# Patient Record
Sex: MALE | Race: WHITE | NOT HISPANIC OR LATINO | Employment: UNEMPLOYED | URBAN - NONMETROPOLITAN AREA
[De-identification: names, ages, dates, MRNs, and addresses within clinical notes are randomized per-mention and may not be internally consistent; named-entity substitution may affect disease eponyms.]

---

## 2018-05-25 ENCOUNTER — OFFICE VISIT (OUTPATIENT)
Dept: PULMONOLOGY | Facility: CLINIC | Age: 58
End: 2018-05-25

## 2018-05-25 VITALS
DIASTOLIC BLOOD PRESSURE: 60 MMHG | OXYGEN SATURATION: 97 % | HEART RATE: 81 BPM | BODY MASS INDEX: 28.56 KG/M2 | HEIGHT: 67 IN | WEIGHT: 182 LBS | SYSTOLIC BLOOD PRESSURE: 115 MMHG | TEMPERATURE: 97.6 F

## 2018-05-25 DIAGNOSIS — R53.83 FATIGUE, UNSPECIFIED TYPE: ICD-10-CM

## 2018-05-25 DIAGNOSIS — G47.33 OSA (OBSTRUCTIVE SLEEP APNEA): Primary | ICD-10-CM

## 2018-05-25 PROCEDURE — 99204 OFFICE O/P NEW MOD 45 MIN: CPT | Performed by: INTERNAL MEDICINE

## 2018-05-25 RX ORDER — LISINOPRIL 2.5 MG/1
2.5 TABLET ORAL DAILY
COMMUNITY

## 2018-05-25 RX ORDER — ALPRAZOLAM 0.25 MG/1
0.25 TABLET ORAL 2 TIMES DAILY PRN
COMMUNITY

## 2018-05-25 NOTE — PROGRESS NOTES
"Subjective   Jovanni Lerner is a 57 y.o. male who is being seen for Sleep Apnea    History of Present Illness   This 57-year-old gentleman has been referred to us by his primary care provider for evaluation of sleep disturbance.  Patient tells me that he has an established diagnosis of obstructive sleep apnea and used CPAP good result.  But his sleep is fragmented again now and he does not feel fresh in the morning time.  In the daytime he remains excessively fatigued fatigued which is progressively increasing over the past few months.  Past Medical History:   Diagnosis Date   • Hypertension      Past Surgical History:   Procedure Laterality Date   • HERNIA REPAIR     • TONSILLECTOMY       Family History   Problem Relation Age of Onset   • Hypertension Mother    • Stroke Brother       reports that he has never smoked. He has never used smokeless tobacco. He reports that he does not drink alcohol or use drugs.  No Known Allergies        Patient has not received a flu shot or a pneumonia vaccination.     The following portions of the patient's history were reviewed and updated as appropriate: allergies, current medications, past family history, past medical history, past social history, past surgical history and problem list.    Review of Systems   Constitutional: Positive for fatigue (with increased daytime sleepiness).   HENT:        Loud snoring   Respiratory: Positive for apnea (Witnessed apnea per family/spouse) and shortness of breath (exhustional).    Cardiovascular: Positive for leg swelling.   Neurological: Positive for headaches.   Psychiatric/Behavioral: Positive for sleep disturbance (Fragmented sleep with unrefreshed feeling in the morning ).        SLEEP: Loud snoring, fragmented sleep, un refreshed feeling in the morning, increased daytime sleepiness    All other systems reviewed and are negative.      Objective   /60   Pulse 81   Temp 97.6 °F (36.4 °C) (Oral)   Ht 170.2 cm (67\")   Wt 82.6 kg " (182 lb)   SpO2 97%   BMI 28.51 kg/m²   Physical Exam   Constitutional: He is oriented to person, place, and time.   HENT:   Head: Normocephalic and atraumatic.   Nose: Mucosal edema present.   Eyes: EOM are normal. Pupils are equal, round, and reactive to light.   Neck: Neck supple.   Cardiovascular: Normal rate, regular rhythm and normal heart sounds.    Pulmonary/Chest: He has rhonchi.   Vesicular breath sound bilaterally with prolonged expiratory phase   Abdominal: Soft. Bowel sounds are normal.   Musculoskeletal: Normal range of motion. He exhibits no deformity.   Neurological: He is alert and oriented to person, place, and time.   Skin: Skin is warm and dry.   Psychiatric: He has a normal mood and affect. His behavior is normal.   Nursing note and vitals reviewed.        Radiology:  No Images in the past 120 days found..    Lab Results:  No results found for any previous visit.       Assessment      ICD-10-CM ICD-9-CM   1. GUANAKITO (obstructive sleep apnea) G47.33 327.23   2. Fatigue, unspecified type R53.83 780.79                DISCUSSION:  This patient's BMI is only 28 but he already had an established diagnosis of obstructive sleep apnea.  Currently his primary problem is excessive fatigue which could be from other medical reasons but we wanted to make sure that he is receiving the right pressure through the pressure device.  I'm sending him for a nocturnal polysomnography for CPAP titration and like to see him again after that.    Plan    Orders Placed This Encounter   Procedures   • Polysomnography 4 or More Parameters With CPAP     No orders of the defined types were placed in this encounter.                 Brandi Bynum MD, FCCP, FAASM  Pulmonary, Critical Care, and Sleep Medicine

## 2019-07-10 ENCOUNTER — TRANSCRIBE ORDERS (OUTPATIENT)
Dept: ADMINISTRATIVE | Facility: HOSPITAL | Age: 59
End: 2019-07-10

## 2019-07-10 DIAGNOSIS — Z13.6 SCREENING FOR AAA (AORTIC ABDOMINAL ANEURYSM): Primary | ICD-10-CM

## 2019-07-15 ENCOUNTER — HOSPITAL ENCOUNTER (OUTPATIENT)
Dept: ULTRASOUND IMAGING | Facility: HOSPITAL | Age: 59
Discharge: HOME | End: 2019-07-15
Admitting: FAMILY MEDICINE

## 2019-07-15 DIAGNOSIS — Z13.6 SCREENING FOR AAA (AORTIC ABDOMINAL ANEURYSM): ICD-10-CM

## 2019-07-15 PROCEDURE — 76706 US ABDL AORTA SCREEN AAA: CPT

## 2019-07-15 PROCEDURE — 76706 US ABDL AORTA SCREEN AAA: CPT | Performed by: RADIOLOGY
